# Patient Record
Sex: FEMALE | Race: WHITE
[De-identification: names, ages, dates, MRNs, and addresses within clinical notes are randomized per-mention and may not be internally consistent; named-entity substitution may affect disease eponyms.]

---

## 2019-01-18 ENCOUNTER — HOSPITAL ENCOUNTER (INPATIENT)
Dept: HOSPITAL 74 - JLDR | Age: 29
LOS: 2 days | Discharge: HOME | DRG: 560 | End: 2019-01-20
Attending: OBSTETRICS & GYNECOLOGY | Admitting: OBSTETRICS & GYNECOLOGY
Payer: COMMERCIAL

## 2019-01-18 VITALS — BODY MASS INDEX: 38.7 KG/M2

## 2019-01-18 DIAGNOSIS — E66.8: ICD-10-CM

## 2019-01-18 DIAGNOSIS — O48.0: ICD-10-CM

## 2019-01-18 DIAGNOSIS — O36.63X0: Primary | ICD-10-CM

## 2019-01-18 DIAGNOSIS — Z3A.40: ICD-10-CM

## 2019-01-18 LAB
ALBUMIN SERPL-MCNC: 2.4 G/DL (ref 3.4–5)
ALP SERPL-CCNC: 218 U/L (ref 45–117)
ALT SERPL-CCNC: 14 U/L (ref 13–61)
ANION GAP SERPL CALC-SCNC: 8 MMOL/L (ref 8–16)
APPEARANCE UR: CLEAR
APTT BLD: 26.2 SECONDS (ref 25.2–36.5)
ARTERIAL BLOOD GAS PCO2: 61.5 MMHG (ref 35–45)
AST SERPL-CCNC: 16 U/L (ref 15–37)
BASE EXCESS BLDA CALC-SCNC: -3.4 MEQ/L (ref -2–2)
BASOPHILS # BLD: 0.2 % (ref 0–2)
BILIRUB SERPL-MCNC: 0.2 MG/DL (ref 0.2–1)
BILIRUB UR STRIP.AUTO-MCNC: NEGATIVE MG/DL
BUN SERPL-MCNC: 9 MG/DL (ref 7–18)
CALCIUM SERPL-MCNC: 8.6 MG/DL (ref 8.5–10.1)
CHLORIDE SERPL-SCNC: 106 MMOL/L (ref 98–107)
CO2 SERPL-SCNC: 22 MMOL/L (ref 21–32)
COLOR UR: (no result)
CREAT SERPL-MCNC: 0.6 MG/DL (ref 0.55–1.3)
DEPRECATED RDW RBC AUTO: 15.6 % (ref 11.6–15.6)
EOSINOPHIL # BLD: 0.4 % (ref 0–4.5)
GLUCOSE SERPL-MCNC: 81 MG/DL (ref 74–106)
HCT VFR BLD CALC: 28.9 % (ref 32.4–45.2)
HGB BLD-MCNC: 9.7 GM/DL (ref 10.7–15.3)
INR BLD: 0.92 (ref 0.83–1.09)
KETONES UR QL STRIP: NEGATIVE
LEUKOCYTE ESTERASE UR QL STRIP.AUTO: NEGATIVE
LYMPHOCYTES # BLD: 23.5 % (ref 8–40)
MCH RBC QN AUTO: 24.2 PG (ref 25.7–33.7)
MCHC RBC AUTO-ENTMCNC: 33.6 G/DL (ref 32–36)
MCV RBC: 71.8 FL (ref 80–96)
MONOCYTES # BLD AUTO: 5.6 % (ref 3.8–10.2)
NEUTROPHILS # BLD: 70.3 % (ref 42.8–82.8)
NITRITE UR QL STRIP: NEGATIVE
PH BLDV: 7.36 [PH] (ref 7.32–7.42)
PH UR: 5 [PH] (ref 5–8)
PLATELET # BLD AUTO: 287 K/MM3 (ref 134–434)
PMV BLD: 8.3 FL (ref 7.5–11.1)
PO2 BLDA: 8.8 MMHG (ref 80–100)
POTASSIUM SERPLBLD-SCNC: 4.5 MMOL/L (ref 3.5–5.1)
PROT SERPL-MCNC: 6.3 G/DL (ref 6.4–8.2)
PROT UR QL STRIP: NEGATIVE
PROT UR QL STRIP: NEGATIVE
PT PNL PPP: 10.9 SEC (ref 9.7–13)
RBC # BLD AUTO: 4.03 M/MM3 (ref 3.6–5.2)
SAO2 % BLDA: 6.4 % (ref 90–98.9)
SODIUM SERPL-SCNC: 136 MMOL/L (ref 136–145)
SP GR UR: 1.01 (ref 1.01–1.03)
URATE SERPL-SCNC: 4.8 MG/DL (ref 2.6–7.2)
UROBILINOGEN UR STRIP-MCNC: NEGATIVE MG/DL (ref 0.2–1)
VENOUS PC02: 40.4 MMHG (ref 38–52)
VENOUS PO2: 29 MMHG (ref 28–48)
WBC # BLD AUTO: 6.5 K/MM3 (ref 4–10)

## 2019-01-18 PROCEDURE — G0008 ADMIN INFLUENZA VIRUS VAC: HCPCS

## 2019-01-18 PROCEDURE — 0HQ9XZZ REPAIR PERINEUM SKIN, EXTERNAL APPROACH: ICD-10-PCS | Performed by: OBSTETRICS & GYNECOLOGY

## 2019-01-18 PROCEDURE — 3E0P7VZ INTRODUCTION OF HORMONE INTO FEMALE REPRODUCTIVE, VIA NATURAL OR ARTIFICIAL OPENING: ICD-10-PCS | Performed by: OBSTETRICS & GYNECOLOGY

## 2019-01-18 RX ADMIN — Medication SCH MLS/HR: at 19:40

## 2019-01-18 NOTE — PN
Delivery





- Delivery


Vaginal Delivery: No Problems, Spontaneous (baby boy delievered vx presentation

, nathaniel position, cord around neckx1, loose, untangled , oral nasal suction done 

at perineum ,shoulders delieverd without difficulty, . amniotic fluid was 

uniformly stained with dark mecconium . baby handed over to neonatologist Dr Grande.Placenta & membranes delievered completely. 1st degree vaginal 

laceration sutured with chr catgut #2/o .. cord blood collected & cord blood 

gas was sent sponge count correct..)


Type of Anesthesia: Epidural


Episiotomy/Laceration: 1st degree


EBL (cc): 400 (coleman output was 250 ml )





Delivery, Single Birth





- Stages of Labor


Date 1st Stage Initiatied: 19


Time 1st Stage Initiated: 06:30


Date 2nd Stage Initiated: 19


Time 2nd Stage Initiated: 19:05


Date of Delivery: 19


Time of Delivery: 19:22


Time Placenta Delivered: 19:27





- Condition of Infant


Pediatrician/Neonatologist Present: Yes


Name: Laurie Grande


Infant Gender: Male


Position: Left, OA (cord around neckx1)


Total Hours ROM (Hrs/Mins): 2hr, 17 min mec uniform





- Apgar


  ** 1 Minute


Apgar Total Score: 9





  ** 5 Minutes


Apgar Total Score: 9





- [place] Feeding Plan


Initial Plan: Exclusive breastfeeding throughout hospitalization





Remarks





- Remarks


Remarks: 


28 yrs  , 40 .2 weeks , gbs neg , pnc at , [facility] intrapartum 

course uneventful

## 2019-01-18 NOTE — HP
Past Medical History





- Primary Care Physician


PCP:: Asia Hines





- Admission


Chief Complaint: 27 yrs  , 40.2 weeks sent from the clinic for induction 

of labor due to feeling pressure pain & large baby for sonogram


History of Present Illness: 


PNC at , [address] [place] [address] . wt gain 21 lbs 


Prenatal panel : 18 O pos,Hbsag neg, Rpr nr,Rubella immune, Hiv neg, Hgb As 

( sickle cell trait), Cf screen neg , lead neg , pa neg gc/ct neg 


18 1 hr gtt 167 


18 3 hr Gtt 97/120/136/81. wnl 


10/16/18 1 hr gtt 184,  Quantiferon neg, Rpr nr 


10/24/18 3 hr gtt 114/125/175/137/106-- wnl


18 h/h 10.2/32.4, plt 29.7, gbs neg , gc/ct neg 


pt had serial sonogram with MFM for fetal growth .


18 sono sliup 8.6 wks , --EDc assigned 19sono neg NT & neg AFP 


                 early fetal echo done --not satisfactory


                 repeat Fetal echo as per pt at 6 months neg 


                 40.2 wks, , efw 4270 gm ( 9'7") ,BPP 8/8, NAILA 14.0, Vx , ant 

placenta 





                      





History Source: Patient, Medical Record


Limitations to Obtaining History: No Limitations





- Past Medical History


CNS: No: CVA, Seizure


Cardiovascular: No: HTN, Murmur


Pulmonary: No: Asthma


Gastrointestinal: Yes: Other (none known)


Hepatobiliary: Yes: Other (none known)


...: 4


...Para: 2 (9/3/09  7'8" [facility],  G2 2010-- 10'9"  [facility] ( 

baby estefania heart dis))


...Term: 2


...Induced : 1


...LMP: 18


... Weeks Gestation by Dates: 39.5


...EDC by Dates: 19


...EDC by Sono: 19 (40.3 weeks by sono )


Heme/Onc: Yes: Anemia


Infectious Disease: No: AIDS, HIV, STD's, Tuberculosis


Psych: No: Addictions, Anxiety, Bipolar, Depression, Panic, Psychosis, 

Schizophrenia, Other


Endocrine: Yes: Other (unknpwn)





- Past Surgical History


Past Surgical History: Yes: None


Hx Myomectomy: No


Hx Transabdominal Cerclage: No





- Alcohol/Substance Use


History of Substance Use: reports: None





Home Medications





- Allergies


Allergies/Adverse Reactions: 


 Allergies











Allergy/AdvReac Type Severity Reaction Status Date / Time


 


bee venom protein (honey bee) Allergy Intermediate Swelling Verified 19 12

:33


 


kiwi Allergy Intermediate Hives Verified 19 12:33


 


eggplant Allergy Intermediate Hives Uncoded 19 12:33














- Home Medications


Home Medications: 


Ambulatory Orders





NK [No Known Home Medication]  19 











Physical Exam - Maternity


Vital Signs: 





 Selected Entries











  19





  12:34


 


Weight 255 lb








 Selected Entries











  19





  12:30


 


Temperature 98.2 F


 


Pulse Rate 88


 


Respiratory 20





Rate 


 


Blood Pressure 140/87











Constitutional: Yes: Well Nourished, Mild Distress, Obese


Eyes: Yes: WNL


HENT: Yes: WNL, Normocephalic


Neck: Yes: WNL, Trachea Midline


Cardiovascular: Yes: WNL, Regular Rate and Rhythm


Lungs: Clear to auscultation


Breast(s): Yes: WNL.  No: Mass





- Abdominal Exam/OB


Fundal Height: 40


Number of Fetuses: Single


Fetal Presentation: Vertex


Contractions: No


Fetal Monitor Mode: External


Fetal Heart Rate (range): 130-140


Fetal Heart Rate Location: Midline


Category: I


Accelerations: Uniform


Decelerations: None





- Vaginal Exam/OB


Vaginal Bleediing: No


Dilatation (cm): 1 cm 


Effacement (%): 50


Amniotic Membrane Status: Intact


Fetal Presentation: Vertex/Position


Fetal Station: -3





- Physical Exam


Musculoskeletal: Yes: WNL


Extremities: Yes: WNL.  No: Calf Tenderness


Edema: No


Integumentary: Yes: WNL


Deep Tendon Reflex Grade: Normal +2


...Motor Strength: WNL


Psychiatric: Yes: WNL, Alert, Oriented





- Labs


Lab Results: 





 Laboratory Tests











  19





  13:30 13:30 13:30


 


WBC  6.5  


 


Hgb  9.7 L  


 


Hct  28.9 L  


 


Plt Count  287  


 


PT with INR   10.90 


 


INR   0.92 


 


PTT (Actin FS)   26.2 


 


Sodium   


 


Potassium   


 


Chloride   


 


Carbon Dioxide   


 


Creatinine   


 


Random Glucose   


 


Uric Acid   


 


Total Bilirubin   


 


AST   


 


ALT   


 


Total Protein   


 


Albumin   


 


Urine Protein   


 


RPR Titer    Nonreactive














  19





  13:30 14:15


 


WBC  


 


Hgb  


 


Hct  


 


Plt Count  


 


PT with INR  


 


INR  


 


PTT (Actin FS)  


 


Sodium  136 


 


Potassium  4.5 


 


Chloride  106 


 


Carbon Dioxide  22 


 


Creatinine  0.6 


 


Random Glucose  81 


 


Uric Acid  4.8 


 


Total Bilirubin  0.2 


 


AST  16 


 


ALT  14 


 


Total Protein  6.3 L 


 


Albumin  2.4 L 


 


Urine Protein   Negative


 


RPR Titer  














Problem List





- Problems


(1) Post-term pregnancy, 40-42 weeks of gestation


Code(s): O48.0 - POST-TERM PREGNANCY   





(2) Elective induction of labor planned


Code(s): QBC0363 -    





(3) Obesity (BMI 35.0-39.9 without comorbidity)


Code(s): E66.9 - OBESITY, UNSPECIFIED   





(4) Anemia affecting fourth pregnancy


Code(s): O99.019 - ANEMIA COMPLICATING PREGNANCY, UNSPECIFIED TRIMESTER   





Assessment/Plan


28 yrs  , 40.2 weeks , GBS neg , suspected large baby _ 9'7") admitted 

for induction of labor 


Plan cervidil insertion ( done at 12.50 PM ) 


       trial of vaginal delivery

## 2019-01-18 NOTE — PN
Progress Note, Labor


  ** Vaginal Exam #1


Labor Exam Date: 01/18/19


Labor Exam Time: 17:10


Fetal Heart Rate (range): 150


Dilatation: 4


Effacement (%): 70


Amniotic Membrane Status: Ruptured (SROM, medium meconium uniformly staining 

AFluid)


Fetal Presentation: Vertex/Position


Fetal Station: -2


Remarks: 


fhr cat-1 


uc 2 min 


cervidil in vagina 


 Selected Entries











  01/18/19 01/18/19 01/18/19





  14:00 15:00 16:00


 


Temperature   


 


Pulse Rate   


 


Blood Pressure 140/83 145/90 153/87














  01/18/19





  17:00


 


Temperature 98.4 F


 


Pulse Rate 79


 


Blood Pressure 142/86








17.40 hr cervidil string out of vagina, removed 


        findings , same as above 


        pt requests for epidural 





  ** Vaginal Exam #2


Labor Exam Date: 01/18/19


Labor Exam Time: 18:20


Fetal Heart Rate (range): 140


Dilatation: 5-6


Effacement (%): 80


Amniotic Membrane Status: Ruptured


Fetal Presentation: Vertex/Position


Fetal Station: -1 (-1/0)


Remarks: 


FHR cat-1 , 


uc q 2min 


18.10 hr epidural received 


 Selected Entries











  01/18/19





  18:10


 


Pulse Rate 98 H


 


Blood Pressure 150/90














  ** Vaginal Exam #3


Labor Exam Date: 01/18/19


Labor Exam Time: 19:05


Fetal Heart Rate (range): 120-90


Dilatation: 10


Effacement (%): 100


Amniotic Membrane Status: Ruptured


Fetal Presentation: Vertex/Position


Fetal Station: +1


Remarks: 


fhr cat-2 , 


uc q 2min 


pt pushing 


 Selected Entries











  01/18/19





  18:45


 


Pulse Rate 89


 


Blood Pressure 127/73








 Selected Entries











  01/18/19 01/18/19





  18:25 18:30


 


Pulse Rate 96 H 91 H


 


Blood Pressure 127/73 135/82

## 2019-01-19 LAB
BASOPHILS # BLD: 0.2 % (ref 0–2)
DEPRECATED RDW RBC AUTO: 16 % (ref 11.6–15.6)
EOSINOPHIL # BLD: 0.2 % (ref 0–4.5)
HCT VFR BLD CALC: 27.1 % (ref 32.4–45.2)
HGB BLD-MCNC: 9 GM/DL (ref 10.7–15.3)
LYMPHOCYTES # BLD: 14.7 % (ref 8–40)
MCH RBC QN AUTO: 24 PG (ref 25.7–33.7)
MCHC RBC AUTO-ENTMCNC: 33.3 G/DL (ref 32–36)
MCV RBC: 72.1 FL (ref 80–96)
MONOCYTES # BLD AUTO: 4.7 % (ref 3.8–10.2)
NEUTROPHILS # BLD: 80.2 % (ref 42.8–82.8)
PLATELET # BLD AUTO: 269 K/MM3 (ref 134–434)
PMV BLD: 8.3 FL (ref 7.5–11.1)
RBC # BLD AUTO: 3.76 M/MM3 (ref 3.6–5.2)
WBC # BLD AUTO: 10.3 K/MM3 (ref 4–10)

## 2019-01-19 RX ADMIN — IBUPROFEN PRN MG: 600 TABLET, FILM COATED ORAL at 09:49

## 2019-01-19 RX ADMIN — Medication SCH: at 20:43

## 2019-01-19 RX ADMIN — ACETAMINOPHEN PRN MG: 325 TABLET ORAL at 09:48

## 2019-01-19 RX ADMIN — FERROUS SULFATE TAB EC 324 MG (65 MG FE EQUIVALENT) SCH MG: 324 (65 FE) TABLET DELAYED RESPONSE at 17:49

## 2019-01-19 RX ADMIN — Medication SCH TAB: at 09:38

## 2019-01-19 RX ADMIN — IBUPROFEN PRN MG: 600 TABLET, FILM COATED ORAL at 18:19

## 2019-01-19 RX ADMIN — FERROUS SULFATE TAB EC 324 MG (65 MG FE EQUIVALENT) SCH MG: 324 (65 FE) TABLET DELAYED RESPONSE at 09:38

## 2019-01-19 NOTE — PN
Post Partum Progress Note





- Subjective


Subjective: 


no complains 





Post Partum Day: 1


Type of Delivery: 


Vital Signs: 


 Vital Signs











Temperature  98.3 F   19 02:00


 


Pulse Rate  92 H  19 02:00


 


Respiratory Rate  18   19 02:00


 


Blood Pressure  130/90   19 02:00


 


O2 Sat by Pulse Oximetry (%)  100   19 20:35











Breast Exam: Yes: Soft, Other (BF).  No: Engorged


Uterus: Yes: Fundus Firm, Fundus below umbilicus, Non-tender


Lochia: Yes: Rubra


Lochia, amount: Moderate


Extremities: Yes: Calves non-tender


Perineum: Yes: Laceration (1st degree laceration repaired )


Activity: Ambulating





- Labs


Labs: 


 CBC











WBC  6.5 K/mm3 (4.0-10.0)   19  13:30    


 


RBC  4.03 M/mm3 (3.60-5.2)   19  13:30    


 


Hgb  9.7 GM/dL (10.7-15.3)  L  19  13:30    


 


Hct  28.9 % (32.4-45.2)  L  19  13:30    


 


MCV  71.8 fl (80-96)  L  19  13:30    


 


MCH  24.2 pg (25.7-33.7)  L  19  13:30    


 


MCHC  33.6 g/dl (32.0-36.0)   19  13:30    


 


RDW  15.6 % (11.6-15.6)   19  13:30    


 


Plt Count  287 K/MM3 (134-434)   19  13:30    


 


MPV  8.3 fl (7.5-11.1)   19  13:30    


 


Absolute Neuts (auto)  4.6 K/mm3 (1.5-8.0)   19  13:30    


 


Neutrophils %  70.3 % (42.8-82.8)   19  13:30    


 


Lymphocytes %  23.5 % (8-40)   19  13:30    


 


Monocytes %  5.6 % (3.8-10.2)   19  13:30    


 


Eosinophils %  0.4 % (0-4.5)   19  13:30    


 


Basophils %  0.2 % (0-2.0)   19  13:30    


 


Nucleated RBC %  0 % (0-0)   19  13:30    














Problem List





- Problems


(1) Post-term pregnancy, 40-42 weeks of gestation


Code(s): O48.0 - POST-TERM PREGNANCY   





(2) Elective induction of labor planned


Code(s): ODO3256 -    





(3) Obesity (BMI 35.0-39.9 without comorbidity)


Code(s): E66.9 - OBESITY, UNSPECIFIED   





(4) Anemia affecting fourth pregnancy


Code(s): O99.019 - ANEMIA COMPLICATING PREGNANCY, UNSPECIFIED TRIMESTER   





(5) Normal vaginal delivery


Code(s): O80 - ENCOUNTER FOR FULL-TERM UNCOMPLICATED DELIVERY   





(6) First degree perineal laceration during delivery


Code(s): O70.0 - FIRST DEGREE PERINEAL LACERATION DURING DELIVERY   





(7) Encounter for postpartum care and examination after delivery


Code(s): Z39.2 - ENCOUNTER FOR ROUTINE POSTPARTUM FOLLOW-UP   





Assessment/Plan


stable 


plan cbc pp today


encourage ambulation


discharge tomorrow

## 2019-01-20 VITALS — TEMPERATURE: 98.1 F | HEART RATE: 82 BPM | DIASTOLIC BLOOD PRESSURE: 67 MMHG | SYSTOLIC BLOOD PRESSURE: 110 MMHG

## 2019-01-20 RX ADMIN — IBUPROFEN PRN MG: 600 TABLET, FILM COATED ORAL at 08:18

## 2019-01-20 RX ADMIN — FERROUS SULFATE TAB EC 324 MG (65 MG FE EQUIVALENT) SCH MG: 324 (65 FE) TABLET DELAYED RESPONSE at 08:05

## 2019-01-20 RX ADMIN — Medication SCH TAB: at 10:00

## 2019-01-20 RX ADMIN — ACETAMINOPHEN PRN MG: 325 TABLET ORAL at 08:17

## 2019-01-20 NOTE — DS
Physical Exam-GYN


Vital Signs: 


 Vital Signs











Temperature  98.0 F   19 22:00


 


Pulse Rate  91 H  19 22:00


 


Respiratory Rate  18   19 22:00


 


Blood Pressure  134/66   19 22:00


 


O2 Sat by Pulse Oximetry (%)  100   19 20:35











Constitutional: Yes: Well Nourished, Obese, Pallor


HENT: Yes: WNL


Neck: Yes: WNL


Cardiovascular: Yes: WNL


Respiratory: Yes: WNL


Gastrointestinal: Yes: WNL


...Rectal Exam: Yes: WNL


Renal/: Yes: WNL


....Post Partum: Yes: Uterus firm, Moderate lochia rubra (perineum healing s/p 

1st dgree perineal laceration .)


Breast(s): Yes: WNL (not engorged , soft)


Musculoskeletal: Yes: WNL


Extremities: Yes: WNL.  No: Calf Tenderness


Edema: LLE: Trace, RLE: Trace


Integumentary: Yes: WNL


Neurological: Yes: WNL, Alert, Oriented


...Motor Strength: WNL


Psychiatric: Yes: WNL, Alert, Oriented


Labs: 


 CBC, BMP





 19 07:30 





 19 13:30 











Delivery





- Delivery


Vaginal Delivery: No Problems, Spontaneous (baby boy delievered vx presentation

, nathaniel position, cord around neckx1, loose, untangled , oral nasal suction done 

at perineum ,shoulders delieverd without difficulty, . amniotic fluid was 

uniformly stained with dark mecconium . baby handed over to neonatologist Dr Grande.Placenta & membranes delievered completely. 1st degree vaginal 

laceration sutured with chr catgut #2/o .. cord blood collected & cord blood 

gas was sent sponge count correct..)


Type of Anesthesia: Epidural


Episiotomy/Laceration: 1st degree


EBL (cc): 400 (coleman output was 250 ml )





Delivery, Single Birth





- Stages of Labor


Date 1st Stage Initiatied: 19


Time 1st Stage Initiated: 06:30


Date 2nd Stage Initiated: 19


Time 2nd Stage Initiated: 19:05


Date of Delivery: 19


Time of Delivery: 19:22


Time Placenta Delivered: 19:27





- Condition of Infant


Pediatrician/Neonatologist Present: Yes


Name: Laurie Grande


Infant Gender: Male


Birth Weight: 9 lb 12 oz


Position: Left, OA (cord around neckx1)


Total Hours ROM (Hrs/Mins): 2hr, 17 min mec uniform





- Apgar


  ** 1 Minute


Apgar Total Score: 9





  ** 5 Minutes


Apgar Total Score: 9





-  Feeding Plan


Initial Plan: Exclusive breastfeeding throughout hospitalization





Remarks





- Remarks


Remarks: 


28 yrs  , 40 .2 weeks , gbs neg , pnc at , [facility] intrapartum 

course uneventful 


post partum course uneventful. 


counselled for anemia 


discharge today 








Discharge Summary


Reason For Visit: LABOR


Current Active Problems





Anemia affecting fourth pregnancy (Acute)


Elective induction of labor planned (Acute)


Encounter for postpartum care and examination after delivery (Acute)


First degree perineal laceration during delivery (Acute)


Normal vaginal delivery (Acute)


Obesity (BMI 35.0-39.9 without comorbidity) (Acute)


Post-term pregnancy, 40-42 weeks of gestation (Acute)








Condition: Stable





- Instructions


Diet, Activity, Other Instructions: 


Post Partum Instructions





DIET:


Continue good diet high in protein, calcium, and iron rich foods. Drink at 

least eight (8) glasses of water daily in addition to other fluids.


___   Regular diet











MEDICATIONS:


Continue prenatal vitamins and iron as previously directed. Motrin and Tylenol 

may be taken for minor discomfort. 





ACTIVITY:


Mild to moderate exercise may be started in two (2) weeks. Take frequent rest 

periods. Resume normal activity after six (6) week check up. 





WOUND CARE OF OPERATIVE SITE:


Continue use of perineal bottle until vaginal discharge stops. Keep area clean. 

Shower daily. Keep abdominal wound dry. Report any drainage or redness to 

physician. Tub baths, tampons and douches are not permitted for 6 weeks.





ct  Breast feeding  & or  Bottle feeding





BREAST CARE: (For those that are not breast feeding): If engorgement occurs:


Wear tight fitting bra.


Take Tylenol or Motrin for pain.


Apply cold packs (ice in bags to each breast )





FAMILY PLANNING:


There are many birth control alternatives to pursue and they should be 

discussed at your first office visit. You may resume sexual activity after your 

six (6) week check up. (Remember, breast feeding is not a contraceptive)





NEXT PHYSICIAN APPOINTMENT:


Be certain to call for a six (6) week appointment, unless otherwise directed.  





Call Clinic or got to Emergency Dept if you have any of the following:


   Heavy vaginal bleeding


   Painful urination


   Leg pain


   Unusual odor noted to vaginal bleeding


   High fever


   Red streaking noted on breast








Referrals: 


Asia Hines MD [Staff Physician] - 


Disposition: HOME





- Home Medications


Comprehensive Discharge Medication List: 


Ambulatory Orders





Acetaminophen [Tylenol .Regular Strength -] 650 mg PO Q3H PRN #0 tablet  


Ferrous Sulfate [Feosol] 325 mg PO BIDWM #60 tab 19 


Ibuprofen [Motrin -] 200 mg PO Q4H PRN  tablet 19 


Prenatal Vitamins (Sjr) - 1 tab PO DAILY #30 tablet 19

## 2020-06-05 ENCOUNTER — HOSPITAL ENCOUNTER (INPATIENT)
Dept: HOSPITAL 74 - JLDR | Age: 30
LOS: 2 days | Discharge: HOME | DRG: 540 | End: 2020-06-07
Attending: OBSTETRICS & GYNECOLOGY | Admitting: OBSTETRICS & GYNECOLOGY
Payer: COMMERCIAL

## 2020-06-05 VITALS — BODY MASS INDEX: 0.1 KG/M2

## 2020-06-05 DIAGNOSIS — Z88.8: ICD-10-CM

## 2020-06-05 DIAGNOSIS — O36.63X0: ICD-10-CM

## 2020-06-05 DIAGNOSIS — D64.9: ICD-10-CM

## 2020-06-05 DIAGNOSIS — Z91.030: ICD-10-CM

## 2020-06-05 DIAGNOSIS — Z3A.38: ICD-10-CM

## 2020-06-05 LAB
HIV 1+2 AB+HIV1 P24 AG SERPL QL IA: NEGATIVE
TREPONEMA PALLIDUM AB [UNITS/VOLUME] IN SERUM OR PLASMA BY IMMUNOASSAY: (no result)

## 2020-06-05 PROCEDURE — 3E033VJ INTRODUCTION OF OTHER HORMONE INTO PERIPHERAL VEIN, PERCUTANEOUS APPROACH: ICD-10-PCS | Performed by: OBSTETRICS & GYNECOLOGY

## 2020-06-05 PROCEDURE — 10907ZC DRAINAGE OF AMNIOTIC FLUID, THERAPEUTIC FROM PRODUCTS OF CONCEPTION, VIA NATURAL OR ARTIFICIAL OPENING: ICD-10-PCS | Performed by: OBSTETRICS & GYNECOLOGY

## 2020-06-05 RX ADMIN — Medication SCH MLS/HR: at 13:00

## 2020-06-05 RX ADMIN — ACETAMINOPHEN PRN MG: 325 TABLET ORAL at 22:13

## 2020-06-05 RX ADMIN — IBUPROFEN PRN MG: 600 TABLET, FILM COATED ORAL at 22:13

## 2020-06-06 LAB
BASOPHILS # BLD: 0.3 % (ref 0–2)
DEPRECATED RDW RBC AUTO: 17.3 % (ref 11.6–15.6)
EOSINOPHIL # BLD: 0.3 % (ref 0–4.5)
HCT VFR BLD CALC: 24.6 % (ref 32.4–45.2)
HGB BLD-MCNC: 7.8 GM/DL (ref 10.7–15.3)
LYMPHOCYTES # BLD: 12.5 % (ref 8–40)
MCH RBC QN AUTO: 20.7 PG (ref 25.7–33.7)
MCHC RBC AUTO-ENTMCNC: 31.5 G/DL (ref 32–36)
MCV RBC: 65.8 FL (ref 80–96)
MONOCYTES # BLD AUTO: 3.9 % (ref 3.8–10.2)
NEUTROPHILS # BLD: 83 % (ref 42.8–82.8)
PLATELET # BLD AUTO: 241 K/MM3 (ref 134–434)
PMV BLD: 8.1 FL (ref 7.5–11.1)
RBC # BLD AUTO: 3.74 M/MM3 (ref 3.6–5.2)
WBC # BLD AUTO: 9 K/MM3 (ref 4–10)

## 2020-06-06 RX ADMIN — IBUPROFEN PRN MG: 600 TABLET, FILM COATED ORAL at 16:12

## 2020-06-06 RX ADMIN — ACETAMINOPHEN PRN MG: 325 TABLET ORAL at 10:20

## 2020-06-06 RX ADMIN — ACETAMINOPHEN PRN MG: 325 TABLET ORAL at 21:25

## 2020-06-06 RX ADMIN — SIMETHICONE CHEW TAB 80 MG PRN MG: 80 TABLET ORAL at 21:25

## 2020-06-06 RX ADMIN — IBUPROFEN PRN MG: 600 TABLET, FILM COATED ORAL at 06:25

## 2020-06-06 RX ADMIN — Medication SCH MLS/HR: at 02:34

## 2020-06-06 RX ADMIN — ACETAMINOPHEN PRN MG: 325 TABLET ORAL at 06:25

## 2020-06-06 RX ADMIN — SIMETHICONE CHEW TAB 80 MG PRN MG: 80 TABLET ORAL at 16:13

## 2020-06-06 RX ADMIN — IBUPROFEN PRN MG: 600 TABLET, FILM COATED ORAL at 10:21

## 2020-06-07 VITALS — DIASTOLIC BLOOD PRESSURE: 79 MMHG | TEMPERATURE: 98.2 F | SYSTOLIC BLOOD PRESSURE: 136 MMHG | HEART RATE: 98 BPM

## 2020-06-07 RX ADMIN — SIMETHICONE CHEW TAB 80 MG PRN MG: 80 TABLET ORAL at 04:45

## 2020-06-07 RX ADMIN — ACETAMINOPHEN PRN MG: 325 TABLET ORAL at 04:44

## 2020-06-07 RX ADMIN — ACETAMINOPHEN PRN MG: 325 TABLET ORAL at 09:26

## 2020-06-07 RX ADMIN — SIMETHICONE CHEW TAB 80 MG PRN MG: 80 TABLET ORAL at 09:27

## 2020-09-01 ENCOUNTER — HOSPITAL ENCOUNTER (EMERGENCY)
Dept: HOSPITAL 74 - JERFT | Age: 30
Discharge: HOME | End: 2020-09-01
Payer: COMMERCIAL

## 2020-09-01 VITALS — SYSTOLIC BLOOD PRESSURE: 138 MMHG | DIASTOLIC BLOOD PRESSURE: 78 MMHG | HEART RATE: 89 BPM

## 2020-09-01 VITALS — BODY MASS INDEX: 40.7 KG/M2

## 2020-09-01 DIAGNOSIS — T78.40XA: Primary | ICD-10-CM

## 2020-09-01 PROCEDURE — 3E023GC INTRODUCTION OF OTHER THERAPEUTIC SUBSTANCE INTO MUSCLE, PERCUTANEOUS APPROACH: ICD-10-PCS

## 2020-09-01 RX ADMIN — PREDNISONE ONE MG: 20 TABLET ORAL at 14:59

## 2020-09-01 RX ADMIN — PREDNISONE ONE: 20 TABLET ORAL at 15:03

## 2020-09-01 NOTE — PDOC
Rapid Medical Evaluation


Time Seen by Provider: 09/01/20 14:42


Medical Evaluation: 


                                    Allergies











Allergy/AdvReac Type Severity Reaction Status Date / Time


 


bee venom protein (honey bee) Allergy Intermediate Swelling Verified 01/18/19 

12:33


 


kiwi Allergy Intermediate Hives Verified 01/18/19 12:33


 


eggplant Allergy Intermediate Hives Uncoded 01/18/19 12:33











09/01/20 14:43


CC: stung by a bee to left hand, allergic to bees denies difficulty swallowing, 

itchy throat, or cough


ExaM ; noted raised warm area to left wrist, no other skin involvement, vss


Plan: po meds ordered





**Discharge Disposition





- Diagnosis


 Allergic reaction to bee sting








- Referrals





- Patient Instructions





- Post Discharge Activity

## 2020-09-01 NOTE — PDOC
History of Present Illness





- General


Chief Complaint: Bite


Stated Complaint: ALLERGY REACTION


Time Seen by Provider: 09/01/20 14:42


History Source: Patient


Exam Limitations: Clinical Condition





- History of Present Illness


Initial Comments: 





09/01/20 15:13


Patient with past medical history of bee allergies presented with complaint of 

pain to radial aspect of left wrist status post being stung by a bee while at 

work today.  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 

about an hour ago.  Denies shortness of breath, difficulty breathing, 

palpitation, tongue or lip swelling.  Patient reported localized pain to radial 

aspect of left wrist over area of bee sting.  Denies any other symptoms.  Denies

bee sting in anywhere else


Timing/Duration: reports: just prior to arrival





Past History





- Medical History


Allergies/Adverse Reactions: 


                                    Allergies











Allergy/AdvReac Type Severity Reaction Status Date / Time


 


bee venom protein (honey bee) Allergy Intermediate Swelling Verified 09/01/20 

14:46


 


kiwi Allergy Intermediate Hives Verified 09/01/20 14:46


 


eggplant Allergy Intermediate Hives Uncoded 09/01/20 14:46











Home Medications: 


Ambulatory Orders





Acetaminophen [Tylenol .Regular Strength -] 650 mg PO Q3H PRN #0 tablet 01/19/19




Ferrous Sulfate [Feosol] 325 mg PO BIDWM #60 tab 01/19/19 


Ibuprofen [Motrin -] 200 mg PO Q4H PRN  tablet 01/19/19 


Prenatal Vitamins (Sjr) - 1 tab PO DAILY #30 tablet 01/19/19 


Insulin NPH 36 units SQ DAILY 06/05/20 


Breast Pump 1 each MC 5XD 30 Days #1 each 06/06/20 


Ferrous Sulfate [Feosol] 325 mg PO DAILY #30 tablet 06/06/20 


Ibuprofen 600 mg PO Q6H PRN #30 tablet 06/06/20 


Oxycodone HCl/Acetaminophen [Percocet 5-325 mg Tablet -] 1 - 2 tab PO Q6H PRN 

#20 tab MDD 20 06/06/20 


Famotidine [Pepcid -] 20 mg PO BID 4 Days #8 tablet 09/01/20 


Hydrocortisone 2.5% Lotion [Hytone 2.5% Lotion -] 1 applic TP BID PRN #1 bottle 

09/01/20 








Asthma: No


Cancer: No


Cardiac Disorders: No


COPD: No


Diabetes: Yes


HTN: No


Seizures: No


Thyroid Disease: No





- Reproductive History


Is Patient Pregnant Now?: No





- Psycho-Social/Smoking History


Smoking History: Never smoked


Have you smoked in the past 12 months: No





**Review of Systems





- Review of Systems


Able to Perform ROS?: Yes


Is the patient limited English proficient: No


Constitutional: No: Chills, Fever, Malaise


HEENTM: No: Symptoms Reported, See HPI, Eye Pain, Blurred Vision, Tearing, 

Recent change in vision, Double Vision, Cataracts, Ear Pain, Ocular Prothesis, 

Ear Discharge, Nose Pain, Nose Congestion, Tinnitus, Nose Bleeding, Hearing 

Loss, Throat Pain, Throat Swelling, Mouth Pain, Dental Problems, Difficulty 

Swallowing, Mouth Swelling, Other


Respiratory: No: Symptoms reported, See HPI, Cough, Orthopnea, Shortness of 

Breath, SOB with Exertion, SOB at Rest, Stridor, Wheezing, Productive cough, 

Hemoptysis, Other


Cardiac (ROS): No: Symptoms Reported, See HPI, Chest Pain, Edema, Irregular 

Heart Rate, Lightheadedness, Palpitations, Syncope, Chest Tightness, Other


Musculoskeletal: Yes: Symptoms Reported, See HPI, Muscle Pain (left wrist over 

area of bee sting)


Integumentary: Yes: Symptoms Reported, See HPI, Lumps (bee sting to left wrist)


Neurological: No: Tingling, Weakness


All Other Systems: Reviewed and Negative





*Physical Exam





- Vital Signs


                                Last Vital Signs











Temp Pulse Resp BP Pulse Ox


 


    89   18   138/78   99 


 


    09/01/20 14:40  09/01/20 14:40  09/01/20 14:40  09/01/20 14:40














- Physical Exam





09/01/20 15:17


GENERAL:


Well developed, well nourished. Awake and alert. No acute distress.


HEENT:  Normocephalic, atraumatic. PERRLA, EOMI. No conjunctival pallor. Sclera 

are non-icteric. Moist mucous membranes. Oropharynx is clear.


NECK: 


Supple. Full ROM. 


PULMONARY: 


No evidence of respiratory distress. . 


MUSCULOSKELETAL 


Normal range of motion at all joints. 


SKIN: 


Warm and dry. Normal capillary refill. 2 cm area of raised skin to radial aspect

of left wrist with mild localized skin erythema from reaction to bite.  No 

evidence of bite to the rest of the body


NEUROLOGICAL: 


Alert, awake, appropriate.  Gait is normal without ataxia.


PSYCHIATRIC: 


Cooperative. Good eye contact. Appropriate mood


General Appearance: Yes: Nourished, Appropriately Dressed.  No: Apparent 

Distress





ED Treatment Course





- Medications


Given in the ED: 


ED Medications














Discontinued Medications














Generic Name Dose Route Start Last Admin





  Trade Name Gi  PRN Reason Stop Dose Admin


 


Dexamethasone Sodium Phosphate  10 mg  09/01/20 15:06  09/01/20 15:12





  Decadron Injection -  IM  09/01/20 15:07  10 mg





  ONCE ONE   Administration


 


Diphenhydramine HCl  50 mg  09/01/20 14:42  09/01/20 14:59





  Benadryl -  PO  09/01/20 14:43  50 mg





  ONCE ONE   Administration


 


Famotidine  20 mg  09/01/20 14:42  09/01/20 14:59





  Pepcid -  PO  09/01/20 14:43  20 mg





  ONCE ONE   Administration


 


Prednisone  60 mg  09/01/20 14:42  09/01/20 15:03





  Deltasone -  PO  09/01/20 14:43  Not Given





  ONCE ONE  














Medical Decision Making





- Medical Decision Making





09/01/20 15:14


Patient with past medical history of bee allergies presented with complaint of 

pain to radial aspect of left wrist status post being stung by a bee while at 

work today.  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 

about an hour ago.  Denies shortness of breath, difficulty breathing, 

palpitation, tongue or lip swelling.  Patient reported localized pain to radial 

aspect of left wrist over area of bee sting.  Denies any other symptoms.  Denies

bee sting in anywhere else





Exam significant for 2 cm area of raised skin to radial aspect of left wrist 

with mild localized skin erythema from reaction to bite.  No evidence of bite to

the rest of the body.  Oropharynx patent.  Patient in no acute distress.





Symptoms likely mild allergic reaction from bee sting.  Decadron 10 mg IM 

ordered for allergic reaction, 50 mg Benadryl p.o. and 20 mg Pepcid p.o. ordered

from triage.  Patient be observed for 30 minutes minutes for any worsening 

reaction and reassess





09/01/20 15:49


Patient reported improvement of wrist pain  and wrist swelling post Decadron, 

Benadryl and Pepcid.  Patient stable for discharge on hydrocortisone cream and 

p.o. Pepcid for allergic reaction with strict follow-up instructions








Discharge





- Discharge Information


Problems reviewed: Yes


Clinical Impression/Diagnosis: 


 Allergic reaction to bee sting





Condition: Stable


Disposition: HOME





- Admission


No





- Additional Discharge Information


Prescriptions: 


Hydrocortisone 2.5% Lotion [Hytone 2.5% Lotion -] 1 applic TP BID PRN #1 bottle


 PRN Reason: Allergic rash


Famotidine [Pepcid -] 20 mg PO BID 4 Days #8 tablet





- Follow up/Referral


Referrals: 


Deneen Dailey CNM [Primary Care Provider] - 





- Patient Discharge Instructions


Patient Printed Discharge Instructions:  DI for Insect Bites and Stings


Additional Instructions: 


Take prescribed medication and cream for allergic reaction to bee sting.  Follow

with your primary care as needed.  Come back to emergency room if worsening 

swelling including difficulty breathing, swelling to lip or tongue or throat as 

this will be an emergency and needs to be treated right away





- Post Discharge Activity

## 2021-04-30 ENCOUNTER — HOSPITAL ENCOUNTER (EMERGENCY)
Dept: HOSPITAL 74 - JERFT | Age: 31
Discharge: HOME | End: 2021-04-30
Payer: COMMERCIAL

## 2021-04-30 VITALS — BODY MASS INDEX: 39.1 KG/M2

## 2021-04-30 VITALS — TEMPERATURE: 97.6 F | SYSTOLIC BLOOD PRESSURE: 126 MMHG | HEART RATE: 82 BPM | DIASTOLIC BLOOD PRESSURE: 81 MMHG

## 2021-04-30 DIAGNOSIS — S61.217A: Primary | ICD-10-CM
